# Patient Record
(demographics unavailable — no encounter records)

---

## 2025-07-03 NOTE — PHYSICAL EXAM
[General Appearance - Alert] : alert [General Appearance - In No Acute Distress] : in no acute distress [Oriented To Time, Place, And Person] : oriented to person, place, and time [Impaired Insight] : insight and judgment were intact [Affect] : the affect was normal [Person] : oriented to person [Place] : oriented to place [Time] : oriented to time [Concentration Intact] : normal concentrating ability [Visual Intact] : visual attention was ~T not ~L decreased [Naming Objects] : no difficulty naming common objects [Repeating Phrases] : no difficulty repeating a phrase [Writing A Sentence] : no difficulty writing a sentence [Fluency] : fluency intact [Comprehension] : comprehension intact [Reading] : reading intact [Past History] : adequate knowledge of personal past history [Cranial Nerves Optic (II)] : visual acuity intact bilaterally,  visual fields full to confrontation, pupils equal round and reactive to light [Cranial Nerves Oculomotor (III)] : extraocular motion intact [Cranial Nerves Trigeminal (V)] : facial sensation intact symmetrically [Cranial Nerves Facial (VII)] : face symmetrical [Cranial Nerves Vestibulocochlear (VIII)] : hearing was intact bilaterally [Cranial Nerves Glossopharyngeal (IX)] : tongue and palate midline [Cranial Nerves Accessory (XI - Cranial And Spinal)] : head turning and shoulder shrug symmetric [Cranial Nerves Hypoglossal (XII)] : there was no tongue deviation with protrusion [Motor Tone] : muscle tone was normal in all four extremities [Motor Strength] : muscle strength was normal in all four extremities [No Muscle Atrophy] : normal bulk in all four extremities [Motor Handedness Right-Handed] : the patient is right hand dominant [Sensation Tactile Decrease] : light touch was intact [Sensation Vibration Decrease] : vibration was intact [Proprioception] : proprioception was intact [Balance] : balance was intact [Past-pointing] : there was no past-pointing [Tremor] : no tremor present [2+] : Brachioradialis left 2+ [1+] : Ankle jerk left 1+ [Plantar Reflex Right Only] : normal on the right [Plantar Reflex Left Only] : normal on the left [___] : absent on the right [___] : absent on the left [Primitive Reflexes] : primitive reflexes were absent [Glabellar Reflex] : the glabellar reflex was absent [Edema] : there was no peripheral edema [No Spinal Tenderness] : no spinal tenderness [Abnormal Walk] : normal gait [] : no rash [FreeTextEntry1] : Well healed scars from prior knee surgeries

## 2025-07-03 NOTE — DISCUSSION/SUMMARY
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 Due to improved symptoms since onset at this point, hold off pharmacological treatment. Avoiding pressure to area prone to be compressed along the nerve pathway in her daily living and at nighttime was discussed.

## 2025-07-03 NOTE — HISTORY OF PRESENT ILLNESS
[FreeTextEntry1] : She is here for evaluation of symptoms of left lower extremity.  She is very athletic and sustained some musculoskeletal injuries from doing various sports. About three months ago, she developed a sudden onset of tingling and electrical shock type of sensation over the lateral aspect of thigh on left side after having tight compression boot from thigh to leg during PT.  The intense pain relieved over time. However, there are still pulling sensation, tingling in the left lateral thigh. The affected area is sensitive to touch. When the symptoms flare up at night, she loses sleep. She rarely took Aleve for the symptoms. At this point, the overall severity of symptoms have improved.  She also developed right hip pain over the last few months, more noticeable when she stands and walks. Pending orthopedic evaluation. She denies low back pain. She had sciatica when she was pregnant 28 years ago. She denies weakness or incontinence.